# Patient Record
Sex: MALE | Race: WHITE | HISPANIC OR LATINO | URBAN - METROPOLITAN AREA
[De-identification: names, ages, dates, MRNs, and addresses within clinical notes are randomized per-mention and may not be internally consistent; named-entity substitution may affect disease eponyms.]

---

## 2021-09-30 ENCOUNTER — EMERGENCY (EMERGENCY)
Facility: HOSPITAL | Age: 31
LOS: 1 days | Discharge: HOME | End: 2021-09-30
Attending: EMERGENCY MEDICINE
Payer: SELF-PAY

## 2021-09-30 VITALS
HEART RATE: 86 BPM | OXYGEN SATURATION: 96 % | HEIGHT: 68 IN | SYSTOLIC BLOOD PRESSURE: 119 MMHG | DIASTOLIC BLOOD PRESSURE: 67 MMHG | TEMPERATURE: 98 F | RESPIRATION RATE: 20 BRPM | WEIGHT: 195.11 LBS

## 2021-09-30 PROCEDURE — 99283 EMERGENCY DEPT VISIT LOW MDM: CPT

## 2021-09-30 NOTE — ED ADULT TRIAGE NOTE - CHIEF COMPLAINT QUOTE
left eye injury s/p hitting eye with piece of wood. Denies bleeding/discharge, endorses blurry eye vision in affected eye. Denies CP/SOB, f/c.

## 2021-09-30 NOTE — ED PROVIDER NOTE - ATTENDING CONTRIBUTION TO CARE
I performed a history and physical exam of the patient and discussed their management with the resident and /or advanced care provider. I reviewed the resident and /or ACP's note and agree with the documented findings and plan of care. My medical decision making and observations are found above.  Lungs clear abd soft, slit lamp exam with no evidence of globe rupture.

## 2021-09-30 NOTE — ED ADULT NURSE NOTE - OBJECTIVE STATEMENT
31y M presents with left eye pain after hitting eye with tree branch. pt states he was trying to break a tree branch when it snapped and hit him in the eye. lost [id number] for seconds but it returned shortly after. pt states L eye is blurry and feels like something is in it so came to ED. left eye conjunctiva reddened but pt able to see. taken by MD to eye room.

## 2021-09-30 NOTE — ED PROVIDER NOTE - PHYSICAL EXAMINATION
Gen: NAD, non-toxic appearing  Head: normal appearing  HEENT: normal conjunctiva, oral mucosa moist, unable to assess visual acuity pt did no have glasses. slit lamp exam with fluorescin no obvious large conreal abrasions. no abnormal iris contor or blood in anterior chamber. L eye non reactive to light.   Lung: no respiratory distress, speaking in full sentences, CTA b/l     CV: regular rate and rhythm, no murmurs  Abd: soft, non distended, non tender   MSK: no visible deformities  Neuro: No focal deficits, AAOx3  Skin: Warm  Psych: normal affect

## 2021-09-30 NOTE — ED PROVIDER NOTE - OBJECTIVE STATEMENT
30 yo M no pmhx p/w traumatic injury to L eye. Pt was ripping a branch off a tree at 230pm when it snapped and hit him in the eye. He immediately felt pain and was unable to see for several seconds. He regained vision and has since had blurry vision with sensation of debris in his eye. Pt denies photophobia. He did not his head or have loc. He denies HA cp sob f/c n/v abd pain.

## 2021-09-30 NOTE — ED PROVIDER NOTE - CLINICAL SUMMARY MEDICAL DECISION MAKING FREE TEXT BOX
30 yo M p/w traumatic eye pain and blurry vision. slit lamp exam with fluorescin no obvious large corneal abrasions. no abnormal iris contour or shape or blood in anterior chamber. L eye non reactive to light. Likely traumatic iritis with concern for corneal abrasion. Less concern for globe rupture. Will give erythromycin eye ointment and f/u with Optho. 32 yo M p/w traumatic eye pain and blurry vision. slit lamp exam with fluorescin no obvious large corneal abrasions. no abnormal iris contour or shape or blood in anterior chamber. L eye non reactive to light. Likely traumatic iritis with concern for corneal abrasion. Less concern for globe rupture. Will give erythromycin eye ointment and f/u with Optho.  Christin: struck in eye with branch. No hyphema. + traumatic iritis. No s+s of globe rupture. Vision. ok. will treat as abraision and traumaitic iritis and d/c with antibiotics and optho fu.

## 2021-09-30 NOTE — ED PROVIDER NOTE - NSFOLLOWUPINSTRUCTIONS_ED_ALL_ED_FT
Corneal Abrasion    The cornea is the clear covering at the front and center of the eye. This very thin tissue is made up of many layers. If a scratch or injury causes the corneal epithelium to come off, it is called a corneal abrasion. Symptoms include eye pain, redness, tearing, difficulty keeping eye open, and light sensitivity. Do not drive or operate machinery if your eye is patched.  Antibiotic eye drops may be prescribed to reduce the risk of infection.  It is important to follow up with an ophthalmologist (eye doctor) to ensure proper healing.    SEEK IMMEDIATE MEDICAL CARE IF YOU HAVE ANY OF THE FOLLOWING SYMPTOMS: discharge from eyes, changes in vision, fever, or swelling.    Please use the eye ointment twice a day for 3 days or until you are able to see an Ophthalmologist

## 2021-09-30 NOTE — ED PROVIDER NOTE - NS ED ROS FT
GENERAL: No fever, no chills  EYES: blurry vision eye pain  HEENT: no trouble swallowing, no trouble speaking  CARDIAC: no chest pain, no palpitations  PULMONARY: no cough, no SOB  GI: no abdominal pain, no nausea, no vomiting, no diarrhea, no constipation  : no dysuria, no frequency, no change in appearance, no odor of urine  SKIN: no rashes  NEURO: no headache, no weakness  MSK: no joint pain

## 2021-09-30 NOTE — ED PROVIDER NOTE - PATIENT PORTAL LINK FT
You can access the FollowMyHealth Patient Portal offered by [facility] by registering at the following website: [email or web address]. By joining [email or web address] FollowMyHealth portal, you will also be able to view your health information using other applications (apps) compatible with our system.

## 2021-10-08 NOTE — ED ADULT NURSE NOTE - NSFALLRSKPASTHIST_ED_ALL_ED
What Is The Reason For Today's Visit?: Full Body Skin Examination
What Is The Reason For Today's Visit? (Being Monitored For X): concerning skin lesions on an annual basis
no

## 2022-11-07 NOTE — ED ADULT TRIAGE NOTE - WEIGHT METHOD
stated [Return Date: _____] : as of [unfilled].  This has been discussed in detail with ~Anibal~ and ~he/she~ understands this. [Full Duty] : full duty